# Patient Record
Sex: FEMALE | Race: WHITE | NOT HISPANIC OR LATINO | Employment: OTHER | URBAN - METROPOLITAN AREA
[De-identification: names, ages, dates, MRNs, and addresses within clinical notes are randomized per-mention and may not be internally consistent; named-entity substitution may affect disease eponyms.]

---

## 2017-01-09 ENCOUNTER — LAB REQUISITION (OUTPATIENT)
Dept: LAB | Facility: HOSPITAL | Age: 72
End: 2017-01-09

## 2017-01-09 DIAGNOSIS — N81.6 RECTOCELE: ICD-10-CM

## 2017-01-09 PROCEDURE — 88304 TISSUE EXAM BY PATHOLOGIST: CPT | Performed by: COLON & RECTAL SURGERY

## 2017-01-10 LAB
CYTO UR: NORMAL
LAB AP CASE REPORT: NORMAL
LAB AP CLINICAL INFORMATION: NORMAL
Lab: NORMAL
PATH REPORT.FINAL DX SPEC: NORMAL
PATH REPORT.GROSS SPEC: NORMAL

## 2022-10-21 ENCOUNTER — TELEPHONE (OUTPATIENT)
Dept: GASTROENTEROLOGY | Facility: CLINIC | Age: 77
End: 2022-10-21

## 2023-01-20 ENCOUNTER — OFFICE VISIT (OUTPATIENT)
Dept: GASTROENTEROLOGY | Facility: CLINIC | Age: 78
End: 2023-01-20
Payer: MEDICARE

## 2023-01-20 VITALS
SYSTOLIC BLOOD PRESSURE: 134 MMHG | WEIGHT: 190.8 LBS | HEART RATE: 73 BPM | TEMPERATURE: 98 F | DIASTOLIC BLOOD PRESSURE: 73 MMHG | HEIGHT: 63 IN | BODY MASS INDEX: 33.81 KG/M2

## 2023-01-20 DIAGNOSIS — K21.9 GASTROESOPHAGEAL REFLUX DISEASE, UNSPECIFIED WHETHER ESOPHAGITIS PRESENT: Primary | ICD-10-CM

## 2023-01-20 DIAGNOSIS — R10.13 EPIGASTRIC PAIN: ICD-10-CM

## 2023-01-20 PROCEDURE — 99204 OFFICE O/P NEW MOD 45 MIN: CPT | Performed by: NURSE PRACTITIONER

## 2023-01-20 RX ORDER — PANTOPRAZOLE SODIUM 40 MG/1
40 TABLET, DELAYED RELEASE ORAL DAILY
Qty: 90 TABLET | Refills: 3 | Status: SHIPPED | OUTPATIENT
Start: 2023-01-20 | End: 2023-03-10

## 2023-01-20 RX ORDER — LISINOPRIL 10 MG/1
10 TABLET ORAL DAILY
COMMUNITY
Start: 2022-12-21

## 2023-01-20 RX ORDER — TRAZODONE HYDROCHLORIDE 50 MG/1
50 TABLET ORAL NIGHTLY
COMMUNITY
Start: 2022-10-31

## 2023-01-20 RX ORDER — TIMOLOL MALEATE 5 MG/ML
SOLUTION/ DROPS OPHTHALMIC
COMMUNITY

## 2023-01-20 RX ORDER — LATANOPROST 50 UG/ML
SOLUTION/ DROPS OPHTHALMIC
COMMUNITY

## 2023-01-20 RX ORDER — OMEPRAZOLE 40 MG/1
40 CAPSULE, DELAYED RELEASE ORAL DAILY
COMMUNITY
Start: 2022-12-21 | End: 2023-01-20

## 2023-01-20 RX ORDER — CITALOPRAM 20 MG/1
20 TABLET ORAL DAILY
COMMUNITY
Start: 2023-01-06

## 2023-01-20 RX ORDER — HYDROCHLOROTHIAZIDE 12.5 MG/1
CAPSULE, GELATIN COATED ORAL
COMMUNITY
Start: 2023-01-16

## 2023-01-20 NOTE — PROGRESS NOTES
GASTROENTEROLOGY OFFICE NOTE  Kathy Whittington  [id number]  1945    CARE TEAM  Patient Care Team:  Malcolm Salinas MD as PCP - General (Family Medicine)    Referring Provider: Malcolm Salinas MD    Chief Complaint   Patient presents with   • Abdominal Pain   • Heartburn   • Nausea        HISTORY OF PRESENT ILLNESS:  Ms. Whittington is a 77-year-old female with complaints of chronic heartburn with associated nausea.  She has been on omeprazole 40 mg daily for several years.  She notes she has taken Nexium in the past as well.  She has been having some abdominal burning in her mid abdomen lately and sometimes feels burning into her back as well.  She has further complaints of early satiety.  She denies dysphagia, odynophagia, or vomiting.    PAST MEDICAL HISTORY  Past Medical History:   Diagnosis Date   • GERD (gastroesophageal reflux disease)    • Hypertension         PAST SURGICAL HISTORY  Past Surgical History:   Procedure Laterality Date   • APPENDECTOMY     • CHOLECYSTECTOMY     • COLONOSCOPY     • HEMORRHOIDECTOMY     • HERNIA REPAIR     • UPPER GASTROINTESTINAL ENDOSCOPY          MEDICATIONS:    Current Outpatient Medications:   •  citalopram (CeleXA) 20 MG tablet, Take 20 mg by mouth Daily., Disp: , Rfl:   •  latanoprost (XALATAN) 0.005 % ophthalmic solution, , Disp: , Rfl:   •  lisinopril (PRINIVIL,ZESTRIL) 10 MG tablet, Take 10 mg by mouth Daily., Disp: , Rfl:   •  timolol (TIMOPTIC) 0.5 % ophthalmic solution, , Disp: , Rfl:   •  traZODone (DESYREL) 50 MG tablet, Take 50 mg by mouth Every Night., Disp: , Rfl:   •  hydroCHLOROthiazide (MICROZIDE) 12.5 MG capsule, , Disp: , Rfl:   •  pantoprazole (PROTONIX) 40 MG EC tablet, Take 1 tablet by mouth Daily., Disp: 90 tablet, Rfl: 3    ALLERGIES  No Known Allergies    FAMILY HISTORY:  Family History   Problem Relation Age of Onset   • Colon cancer Neg Hx        SOCIAL HISTORY  Social History     Socioeconomic History   • Marital status:    Tobacco  "Use   • Smoking status: Never   • Smokeless tobacco: Never   Vaping Use   • Vaping Use: Never used   Substance and Sexual Activity   • Alcohol use: Never   • Drug use: Never   • Sexual activity: Defer         PHYSICAL EXAM   /73 (BP Location: Left arm, Patient Position: Sitting, Cuff Size: Adult)   Pulse 73   Temp 98 °F (36.7 °C) (Temporal)   Ht 158.8 cm (62.5\")   Wt 86.5 kg (190 lb 12.8 oz)   BMI 34.34 kg/m²   Physical Exam  Constitutional:       Appearance: Normal appearance.   HENT:      Head: Normocephalic and atraumatic.   Pulmonary:      Effort: Pulmonary effort is normal.   Abdominal:      General: There is no distension.      Palpations: Abdomen is soft.      Tenderness: There is abdominal tenderness in the epigastric area.   Neurological:      Mental Status: She is alert and oriented to person, place, and time.   Psychiatric:         Mood and Affect: Mood normal.         Thought Content: Thought content normal.           ASSESSMENT / PLAN  Diagnoses and all orders for this visit:    1. Gastroesophageal reflux disease, unspecified whether esophagitis present (Primary)  -     pantoprazole (PROTONIX) 40 MG EC tablet; Take 1 tablet by mouth Daily.  Dispense: 90 tablet; Refill: 3    2. Epigastric pain  -     Ambulatory referral for Screening EGD           Return for Follow up after procedures.    I discussed the patients findings and my recommendations with patient    BRETT Lee                    "

## 2023-03-10 ENCOUNTER — OUTSIDE FACILITY SERVICE (OUTPATIENT)
Dept: GASTROENTEROLOGY | Facility: CLINIC | Age: 78
End: 2023-03-10
Payer: MEDICARE

## 2023-03-10 PROCEDURE — 43239 EGD BIOPSY SINGLE/MULTIPLE: CPT | Performed by: INTERNAL MEDICINE

## 2023-03-10 RX ORDER — OMEPRAZOLE 40 MG/1
40 CAPSULE, DELAYED RELEASE ORAL
Qty: 60 CAPSULE | Refills: 11 | Status: SHIPPED | OUTPATIENT
Start: 2023-03-10 | End: 2023-03-20 | Stop reason: SDUPTHER

## 2023-03-17 ENCOUNTER — OFFICE VISIT (OUTPATIENT)
Dept: GASTROENTEROLOGY | Facility: CLINIC | Age: 78
End: 2023-03-17
Payer: MEDICARE

## 2023-03-17 VITALS
TEMPERATURE: 98 F | HEART RATE: 73 BPM | WEIGHT: 193.4 LBS | BODY MASS INDEX: 34.27 KG/M2 | SYSTOLIC BLOOD PRESSURE: 134 MMHG | DIASTOLIC BLOOD PRESSURE: 68 MMHG | HEIGHT: 63 IN

## 2023-03-17 DIAGNOSIS — R10.13 EPIGASTRIC PAIN: Primary | ICD-10-CM

## 2023-03-17 DIAGNOSIS — K21.9 GASTROESOPHAGEAL REFLUX DISEASE, UNSPECIFIED WHETHER ESOPHAGITIS PRESENT: ICD-10-CM

## 2023-03-17 PROCEDURE — 99214 OFFICE O/P EST MOD 30 MIN: CPT | Performed by: NURSE PRACTITIONER

## 2023-03-17 PROCEDURE — 1160F RVW MEDS BY RX/DR IN RCRD: CPT | Performed by: NURSE PRACTITIONER

## 2023-03-17 PROCEDURE — 1159F MED LIST DOCD IN RCRD: CPT | Performed by: NURSE PRACTITIONER

## 2023-03-17 NOTE — PROGRESS NOTES
GASTROENTEROLOGY OFFICE NOTE  Kathy Whittington  [id number]  1945    CARE TEAM  Patient Care Team:  Malcolm Salinas MD as PCP - General (Family Medicine)    Referring Provider: Malcolm Salinas MD    Chief Complaint   Patient presents with   • Heartburn        HISTORY OF PRESENT ILLNESS:  Patient presents in follow-up status post EGD for complaints of epigastric pain that radiates into her back.  She is described it as a burning type sensation.  She has been on omeprazole 40 mg daily for several years.    EGD showed a benign-appearing esophageal stenosis which was biopsied showed minimal chronic gastritis, a small hiatal hernia, mild post ulcer deformity in the duodenal bulb and multiple gastric polyps.  Omeprazole was increased to 40 mg twice daily at the time of her EGD.  Given additional complaints of bloating and frequent nausea gastroparesis was diagnosed clinically.    Today, she returns stating that she is not having the burning that she had before however, she continues with epigastric pain that is a sharp pain radiating into her back and across her upper abdomen.    PAST MEDICAL HISTORY  Past Medical History:   Diagnosis Date   • GERD (gastroesophageal reflux disease)    • Hypertension         PAST SURGICAL HISTORY  Past Surgical History:   Procedure Laterality Date   • APPENDECTOMY     • CHOLECYSTECTOMY     • COLONOSCOPY     • HEMORRHOIDECTOMY     • HERNIA REPAIR     • UPPER GASTROINTESTINAL ENDOSCOPY          MEDICATIONS:    Current Outpatient Medications:   •  citalopram (CeleXA) 20 MG tablet, Take 1 tablet by mouth Daily., Disp: , Rfl:   •  hydroCHLOROthiazide (MICROZIDE) 12.5 MG capsule, , Disp: , Rfl:   •  latanoprost (XALATAN) 0.005 % ophthalmic solution, , Disp: , Rfl:   •  lisinopril (PRINIVIL,ZESTRIL) 10 MG tablet, Take 1 tablet by mouth Daily., Disp: , Rfl:   •  omeprazole (priLOSEC) 40 MG capsule, Take 1 capsule by mouth 2 (Two) Times a Day Before Meals. Take a half hour before  "breakfast, Disp: 60 capsule, Rfl: 11  •  timolol (TIMOPTIC) 0.5 % ophthalmic solution, , Disp: , Rfl:   •  traZODone (DESYREL) 50 MG tablet, Take 1 tablet by mouth Every Night., Disp: , Rfl:     ALLERGIES  No Known Allergies    FAMILY HISTORY:  Family History   Problem Relation Age of Onset   • Colon cancer Neg Hx        SOCIAL HISTORY  Social History     Socioeconomic History   • Marital status:    Tobacco Use   • Smoking status: Never   • Smokeless tobacco: Never   Vaping Use   • Vaping Use: Never used   Substance and Sexual Activity   • Alcohol use: Never   • Drug use: Never   • Sexual activity: Defer         PHYSICAL EXAM   /68 (BP Location: Right arm, Patient Position: Sitting, Cuff Size: Adult)   Pulse 73   Temp 98 °F (36.7 °C) (Temporal)   Ht 158.8 cm (62.5\")   Wt 87.7 kg (193 lb 6.4 oz)   BMI 34.81 kg/m²   Physical Exam  Constitutional:       Appearance: Normal appearance.   HENT:      Head: Normocephalic and atraumatic.   Pulmonary:      Effort: Pulmonary effort is normal.   Abdominal:      General: There is no distension.      Palpations: Abdomen is soft.   Neurological:      Mental Status: She is alert and oriented to person, place, and time.   Psychiatric:         Mood and Affect: Mood normal.         Thought Content: Thought content normal.                [facility]   [address] [place] [address]   Phone: [phone] or [phone]   Fax: [phone]   Justen Clark M.D., Medical Director     PATIENT NAME                           LABORATORY NO.   LAKIA CALI                     [id number]   [id number]                         AGE              SEX  SSN           CLIENT REF #   [facility]           77      1945  F    [id number]   [id number]     [address] [facility]              REQUESTING M.D.     ATTENDING M.D.     [place] [address]                NAVIN ZARAGOZA ADALBERTO     "       TALIA   DATE COLLECTED      DATE RECEIVED      DATE REPORTED   03/10/2023          03/10/2023         03/13/2023     CORRECTION PRESENT   CORRECTED:   Corrected patient name - kia 3/15/23     DIAGNOSIS:   A.   GASTRIC ANTRUM, BIOPSY: Mild reactive gastropathic changes with   background of minimal chronic gastritis without activity.   Negative for intestinal metaplasia and dysplasia.   No Helicobacter pylori-like organisms identified on routine stains.   B.   GE JUNCTION: Squamous mucosa with mild basal layer hyperplasia   and slight increase in intraepithelial leukocytes without eosinophils.   Gastric cardia type mucosa showing minimal chronic gastritis without activity.   Negative for intestinal metaplasia and dysplasia.   Histologic changes present are mild and felt to be nonspecific.     CLINICAL HISTORY:   Epigastric pain, nausea, heartburn, esophageal obstruction, diaphragmatic hernia   without obstruction or gangrene, other diseases of stomach and duodenum, polyp   of stomach and duodenum     SPECIMENS RECEIVED:   A.  GASTRIC ANTRUM, BIOPSY   B.  GE JUNCTION     MICROSCOPIC DESCRIPTION:   A.  Tissue blocks are prepared and slides are examined microscopically on all   specimens. See diagnosis for details.   B.  Tissue blocks are prepared and slides are examined microscopically on all   specimens. See diagnosis for details.     Professional interpretation rendered by Oleg Sneed M.D., F.C.A.P. at   [facility], [facility], [address] [place] [id number]         Results Review:      ASSESSMENT / PLAN  Diagnoses and all orders for this visit:    1. Epigastric pain (Primary)  -     CT Abdomen Without Contrast; Future    2. Gastroesophageal reflux disease, unspecified whether esophagitis present  -     omeprazole (priLOSEC) 40 MG capsule; Take 1 capsule by mouth 2 (Two) Times a Day Before Meals. Take a half hour before breakfast  Dispense: 60 capsule; Refill: 11           Return in about 2 months  (around 5/17/2023).    I discussed the patients findings and my recommendations with patient    BRETT Lee

## 2023-03-20 RX ORDER — OMEPRAZOLE 40 MG/1
40 CAPSULE, DELAYED RELEASE ORAL
Qty: 60 CAPSULE | Refills: 11
Start: 2023-03-20

## 2023-04-24 ENCOUNTER — HOSPITAL ENCOUNTER (OUTPATIENT)
Dept: CT IMAGING | Facility: HOSPITAL | Age: 78
Discharge: HOME | End: 2023-04-24
Admitting: NURSE PRACTITIONER
Payer: MEDICARE

## 2023-04-24 DIAGNOSIS — R10.13 EPIGASTRIC PAIN: ICD-10-CM

## 2023-04-24 PROCEDURE — 74150 CT ABDOMEN W/O CONTRAST: CPT

## 2023-08-03 RX ORDER — SODIUM, POTASSIUM,MAG SULFATES 17.5-3.13G
2 SOLUTION, RECONSTITUTED, ORAL ORAL TAKE AS DIRECTED
Qty: 354 ML | Refills: 0 | Status: SHIPPED | OUTPATIENT
Start: 2023-08-03

## 2023-08-11 ENCOUNTER — OUTSIDE FACILITY SERVICE (OUTPATIENT)
Dept: GASTROENTEROLOGY | Facility: CLINIC | Age: 78
End: 2023-08-11
Payer: MEDICARE

## 2023-08-11 PROCEDURE — 88305 TISSUE EXAM BY PATHOLOGIST: CPT

## 2023-08-11 PROCEDURE — 45380 COLONOSCOPY AND BIOPSY: CPT | Performed by: INTERNAL MEDICINE

## 2023-08-14 ENCOUNTER — LAB REQUISITION (OUTPATIENT)
Dept: LAB | Facility: HOSPITAL | Age: 78
End: 2023-08-14
Payer: MEDICARE

## 2023-08-14 DIAGNOSIS — R10.9 UNSPECIFIED ABDOMINAL PAIN: ICD-10-CM

## 2023-08-14 DIAGNOSIS — K57.30 DIVERTICULOSIS OF LARGE INTESTINE WITHOUT PERFORATION OR ABSCESS WITHOUT BLEEDING: ICD-10-CM

## 2023-08-14 DIAGNOSIS — Z86.010 PERSONAL HISTORY OF COLONIC POLYPS: ICD-10-CM

## 2023-08-14 DIAGNOSIS — R19.7 DIARRHEA, UNSPECIFIED: ICD-10-CM

## 2023-08-15 LAB — REF LAB TEST METHOD: NORMAL

## 2023-09-13 ENCOUNTER — OFFICE VISIT (OUTPATIENT)
Dept: GASTROENTEROLOGY | Facility: CLINIC | Age: 78
End: 2023-09-13
Payer: MEDICARE

## 2023-09-13 VITALS
WEIGHT: 186 LBS | HEIGHT: 63 IN | SYSTOLIC BLOOD PRESSURE: 122 MMHG | TEMPERATURE: 97.3 F | DIASTOLIC BLOOD PRESSURE: 48 MMHG | BODY MASS INDEX: 32.96 KG/M2 | HEART RATE: 79 BPM

## 2023-09-13 DIAGNOSIS — K21.9 GASTROESOPHAGEAL REFLUX DISEASE, UNSPECIFIED WHETHER ESOPHAGITIS PRESENT: Primary | ICD-10-CM

## 2023-09-13 DIAGNOSIS — R19.7 DIARRHEA, UNSPECIFIED TYPE: ICD-10-CM

## 2023-09-13 PROCEDURE — 1160F RVW MEDS BY RX/DR IN RCRD: CPT | Performed by: NURSE PRACTITIONER

## 2023-09-13 PROCEDURE — 1159F MED LIST DOCD IN RCRD: CPT | Performed by: NURSE PRACTITIONER

## 2023-09-13 PROCEDURE — 99213 OFFICE O/P EST LOW 20 MIN: CPT | Performed by: NURSE PRACTITIONER

## 2023-09-13 RX ORDER — PRAMIPEXOLE DIHYDROCHLORIDE 0.25 MG/1
TABLET ORAL
COMMUNITY
Start: 2023-08-16

## 2023-09-13 NOTE — PROGRESS NOTES
GASTROENTEROLOGY OFFICE NOTE  Kathy Whittington  [id number]  1945    CARE TEAM  Patient Care Team:  Malcolm Salinas MD as PCP - General (Family Medicine)    Referring Provider: Malcolm Salinas MD    Chief Complaint   Patient presents with    Follow-up        HISTORY OF PRESENT ILLNESS:  Patient presents in follow-up with chronic GERD and recent complaints of diarrhea status post colonoscopy.    She continues on omeprazole 40 mg twice daily with good control of her acid reflux.    Colonoscopy was remarkable for diverticulosis.  Etiology of her complaint of diarrhea was not determined.  She reports back today that her diarrhea has since resolved.  She feels the sudden onset of diarrhea was related to her diet.  She had been eating multiple peaches daily and had increased consumption of lactulose prior to the onset of diarrhea.  She has lessened the lactulose that she is emptying and it is no longer fresh peach season so she is no longer eating peaches.  Her diarrhea has resolved entirely.    PAST MEDICAL HISTORY  Past Medical History:   Diagnosis Date    GERD (gastroesophageal reflux disease)     Hypertension         PAST SURGICAL HISTORY  Past Surgical History:   Procedure Laterality Date    APPENDECTOMY      CHOLECYSTECTOMY      COLONOSCOPY      HEMORRHOIDECTOMY      HERNIA REPAIR      UPPER GASTROINTESTINAL ENDOSCOPY          MEDICATIONS:    Current Outpatient Medications:     citalopram (CeleXA) 20 MG tablet, Take 1 tablet by mouth Daily., Disp: , Rfl:     hydroCHLOROthiazide (MICROZIDE) 12.5 MG capsule, , Disp: , Rfl:     lisinopril (PRINIVIL,ZESTRIL) 10 MG tablet, Take 1 tablet by mouth Daily., Disp: , Rfl:     omeprazole (priLOSEC) 40 MG capsule, Take 1 capsule by mouth 2 (Two) Times a Day Before Meals. Take a half hour before breakfast (Patient taking differently: Take 1 capsule by mouth Daily. Take a half hour before breakfast), Disp: 60 capsule, Rfl: 11    pramipexole (MIRAPEX) 0.25 MG tablet, ,  "Disp: , Rfl:     timolol (TIMOPTIC) 0.5 % ophthalmic solution, , Disp: , Rfl:     traZODone (DESYREL) 50 MG tablet, Take 1 tablet by mouth Every Night., Disp: , Rfl:     ALLERGIES  No Known Allergies    FAMILY HISTORY:  Family History   Problem Relation Age of Onset    Colon cancer Neg Hx        SOCIAL HISTORY  Social History     Socioeconomic History    Marital status:    Tobacco Use    Smoking status: Never    Smokeless tobacco: Never   Vaping Use    Vaping Use: Never used   Substance and Sexual Activity    Alcohol use: Never    Drug use: Never    Sexual activity: Defer         PHYSICAL EXAM   /48 (BP Location: Right arm, Patient Position: Sitting, Cuff Size: Adult)   Pulse 79   Temp 97.3 °F (36.3 °C) (Temporal)   Ht 158.8 cm (62.5\")   Wt 84.4 kg (186 lb)   BMI 33.48 kg/m²   Physical Exam  Constitutional:       Appearance: Normal appearance.   HENT:      Head: Normocephalic and atraumatic.   Pulmonary:      Effort: Pulmonary effort is normal.   Neurological:      Mental Status: She is alert and oriented to person, place, and time.   Psychiatric:         Mood and Affect: Mood normal.         Thought Content: Thought content normal.         Results Review:  [facility]  [address] [place] [address]  Phone: [phone] or [phone]  Fax: [phone]  Justen Clark M.D., Medical Director    PATIENT NAME                           LABORATORY NO.  153  LAKIA RODRÍGUEZ                     [id number]  [id number]                         AGE              SEX  SSN           CLIENT REF #  [facility]           77      1945  F    [id number]   [id number]    [address] [facility]              REQUESTING M.D.     ATTENDING [place] [address]                NAVIN ZARAGOZA ADALBERTO THOMAS  DATE COLLECTED      DATE RECEIVED      DATE REPORTED  2023         " 08/15/2023    DIAGNOSIS:  RANDOM COLON BIOPSIES:  Colonic mucosa with no significant pathologic change    [place]    CLINICAL HISTORY:  Diverticulosis of large intestine without perforation or abscess without bleeding,  Personal history of colonic polyps    SPECIMENS RECEIVED:  RANDOM COLON BIOPSIES    MICROSCOPIC DESCRIPTION:  Tissue blocks are prepared and slides are examined microscopically on all  specimens. See diagnosis for details.      ASSESSMENT / PLAN  Diagnoses and all orders for this visit:    1. Gastroesophageal reflux disease, unspecified whether esophagitis present (Primary)    2. Diarrhea, unspecified type       -Continue omeprazole 40 mg twice daily  - Diarrhea resolved etiology felt to be related to dietary indiscretions.    Return in about 1 year (around 9/13/2024).    I discussed the patients findings and my recommendations with patient    BRETT Lee

## 2024-05-25 DIAGNOSIS — K21.9 GASTROESOPHAGEAL REFLUX DISEASE, UNSPECIFIED WHETHER ESOPHAGITIS PRESENT: ICD-10-CM

## 2024-05-28 RX ORDER — OMEPRAZOLE 40 MG/1
CAPSULE, DELAYED RELEASE ORAL
Qty: 60 CAPSULE | Refills: 11 | Status: SHIPPED | OUTPATIENT
Start: 2024-05-28

## 2024-09-24 ENCOUNTER — OFFICE VISIT (OUTPATIENT)
Dept: GASTROENTEROLOGY | Facility: CLINIC | Age: 79
End: 2024-09-24
Payer: MEDICARE

## 2024-09-24 VITALS
HEIGHT: 63 IN | SYSTOLIC BLOOD PRESSURE: 152 MMHG | DIASTOLIC BLOOD PRESSURE: 78 MMHG | TEMPERATURE: 97.5 F | BODY MASS INDEX: 33.49 KG/M2 | HEART RATE: 65 BPM | WEIGHT: 189 LBS

## 2024-09-24 DIAGNOSIS — K21.9 GASTROESOPHAGEAL REFLUX DISEASE, UNSPECIFIED WHETHER ESOPHAGITIS PRESENT: Primary | ICD-10-CM

## 2024-09-24 PROCEDURE — 1159F MED LIST DOCD IN RCRD: CPT

## 2024-09-24 PROCEDURE — 99213 OFFICE O/P EST LOW 20 MIN: CPT

## 2024-09-24 PROCEDURE — 1160F RVW MEDS BY RX/DR IN RCRD: CPT

## 2024-09-24 RX ORDER — OMEPRAZOLE 40 MG/1
40 CAPSULE, DELAYED RELEASE ORAL DAILY
Qty: 90 CAPSULE | Refills: 3 | Status: SHIPPED | OUTPATIENT
Start: 2024-09-24

## 2024-09-24 RX ORDER — LATANOPROST 50 UG/ML
SOLUTION/ DROPS OPHTHALMIC
COMMUNITY
Start: 2024-08-31

## 2024-09-24 RX ORDER — DORZOLAMIDE HYDROCHLORIDE AND TIMOLOL MALEATE 20; 5 MG/ML; MG/ML
SOLUTION/ DROPS OPHTHALMIC
COMMUNITY
Start: 2024-08-31

## 2025-04-14 ENCOUNTER — OFFICE VISIT (OUTPATIENT)
Dept: GASTROENTEROLOGY | Facility: CLINIC | Age: 80
End: 2025-04-14
Payer: MEDICARE

## 2025-04-14 VITALS
SYSTOLIC BLOOD PRESSURE: 157 MMHG | BODY MASS INDEX: 35.28 KG/M2 | WEIGHT: 196 LBS | DIASTOLIC BLOOD PRESSURE: 80 MMHG | HEART RATE: 79 BPM

## 2025-04-14 DIAGNOSIS — K21.9 GASTROESOPHAGEAL REFLUX DISEASE, UNSPECIFIED WHETHER ESOPHAGITIS PRESENT: ICD-10-CM

## 2025-04-14 DIAGNOSIS — K59.04 CHRONIC IDIOPATHIC CONSTIPATION: Primary | ICD-10-CM

## 2025-04-14 RX ORDER — MONTELUKAST SODIUM 10 MG/1
10 TABLET ORAL NIGHTLY
COMMUNITY
Start: 2025-04-03

## 2025-04-14 RX ORDER — DORZOLAMIDE HCL 20 MG/ML
SOLUTION/ DROPS OPHTHALMIC NIGHTLY
COMMUNITY
Start: 2025-01-24

## 2025-04-14 RX ORDER — LOSARTAN POTASSIUM 50 MG/1
50 TABLET ORAL DAILY
COMMUNITY
Start: 2025-03-31 | End: 2026-03-31

## 2025-04-14 RX ORDER — ALBUTEROL SULFATE 90 UG/1
INHALANT RESPIRATORY (INHALATION)
COMMUNITY
Start: 2024-10-31

## 2025-04-14 RX ORDER — OMEPRAZOLE 40 MG/1
40 CAPSULE, DELAYED RELEASE ORAL 2 TIMES DAILY
Qty: 180 CAPSULE | Refills: 3 | Status: SHIPPED | OUTPATIENT
Start: 2025-04-14

## 2025-04-14 RX ORDER — BUDESONIDE AND FORMOTEROL FUMARATE 160; 4.5 UG/1; UG/1
AEROSOL, METERED RESPIRATORY (INHALATION)
COMMUNITY
Start: 2025-01-23

## 2025-04-14 NOTE — PROGRESS NOTES
Office Note     Name: Kathy Whittington    : 1945     MRN: [id number]     Chief Complaint  Gastric Pain    Subjective     History of Present Illness:  History of Present Illness  Patient is a 79 year old female who presents for follow-up of bilateral lower abdominal pain, which is often worse after meals, with significant bloating.  Her  passed away in April of last year and she reports that her eating habits have suffered as a result, often eating convenience foods and not a lot of of fiber or whole foods.  She has recent history of diarrhea which resolved spontaneously, but stools have now been on the firmer side with periods of constipation.  She remains on omeprazole 40 mg twice daily with good control of acid reflux.    Past Medical History:   Past Medical History:   Diagnosis Date    GERD (gastroesophageal reflux disease)     Hypertension        Past Surgical History:   Past Surgical History:   Procedure Laterality Date    APPENDECTOMY      CHOLECYSTECTOMY      COLONOSCOPY      HEMORRHOIDECTOMY      HERNIA REPAIR      UPPER GASTROINTESTINAL ENDOSCOPY         Immunizations:   Immunization History   Administered Date(s) Administered    COVID-19 (PFIZER) 12YRS+ (COMIRNATY) 2023    COVID-19 (PFIZER) BIVALENT 12+YRS 2022    COVID-19 (PFIZER) Purple Cap Monovalent 2021, 2021, 10/02/2021    Covid-19 (Pfizer) Gray Cap Monovalent 2022        Medications:     Current Outpatient Medications:     albuterol sulfate  (90 Base) MCG/ACT inhaler, , Disp: , Rfl:     Breyna 160-4.5 MCG/ACT inhaler, , Disp: , Rfl:     citalopram (CeleXA) 20 MG tablet, Take 1 tablet by mouth Daily., Disp: , Rfl:     dorzolamide (TRUSOPT) 2 % ophthalmic solution, Administer  to both eyes Every Night., Disp: , Rfl:     dorzolamide-timolol (COSOPT) 2-0.5 % ophthalmic solution, , Disp: , Rfl:     hydroCHLOROthiazide (MICROZIDE) 12.5 MG capsule, , Disp: , Rfl:     latanoprost (XALATAN) 0.005 %  "ophthalmic solution, , Disp: , Rfl:     losartan (COZAAR) 50 MG tablet, Take 1 tablet by mouth Daily., Disp: , Rfl:     montelukast (SINGULAIR) 10 MG tablet, Take 1 tablet by mouth Every Night., Disp: , Rfl:     multivitamin with minerals (MULTIVITAMIN ADULT PO), Take 1 tablet by mouth Daily., Disp: , Rfl:     omeprazole (priLOSEC) 40 MG capsule, Take 1 capsule by mouth Daily., Disp: 90 capsule, Rfl: 3    pramipexole (MIRAPEX) 0.25 MG tablet, , Disp: , Rfl:     Allergies:   No Known Allergies    Family History:   Family History   Problem Relation Age of Onset    Colon cancer Neg Hx        Social History:   Social History     Socioeconomic History    Marital status:    Tobacco Use    Smoking status: Never    Smokeless tobacco: Never   Vaping Use    Vaping status: Never Used   Substance and Sexual Activity    Alcohol use: Never    Drug use: Never    Sexual activity: Defer         Objective     Vital Signs  /80 (BP Location: Left arm, Patient Position: Sitting, Cuff Size: Adult)   Pulse 79   Wt 88.9 kg (196 lb)   BMI 35.28 kg/m²   Estimated body mass index is 35.28 kg/m² as calculated from the following:    Height as of 9/24/24: 158.8 cm (62.5\").    Weight as of this encounter: 88.9 kg (196 lb).          Physical Exam  Vitals reviewed.   Constitutional:       General: She is awake.   Cardiovascular:      Rate and Rhythm: Normal rate.   Pulmonary:      Effort: Pulmonary effort is normal.   Abdominal:      Palpations: Abdomen is soft.      Tenderness: There is no abdominal tenderness.   Skin:     General: Skin is warm and dry.   Neurological:      Mental Status: She is alert.        Physical Exam      Results        Assessment and Plan     Assessment & Plan  Gastroesophageal reflux disease, unspecified whether esophagitis present    Orders:    omeprazole (priLOSEC) 40 MG capsule; Take 1 capsule by mouth 2 (Two) Times a Day.    Chronic idiopathic constipation  Increase dietary fiber to ideally 25-30 g a " day, using fiber supplement as needed.  Recommend FiberCon tablets as they do not cause bloating.  Recommended bowels cleanse with MiraLAX which was discussed in office and provided in writing.  If no improvement in abdominal pain with treatment of constipation would recommend imaging to rule out diverticulitis, given known history of diverticulosis seen on recent colonoscopy in 2023.  Given increase in life stressors over the past year with the passing of her , she may also be experiencing some functional symptoms related to IBS, which may be better managed with medication like Elavil.  Will consider this if the remainder of workup is unremarkable.          Assessment & Plan        Follow Up  6 months    Patient or patient representative verbalized consent for the use of Ambient Listening during the visit with  BRETT Pappas for chart documentation. 4/14/2025  15:51 EDT    BRETT Pappas  [facility] [address]  [facility]  [address] [place] [id number]

## 2025-05-22 ENCOUNTER — TELEPHONE (OUTPATIENT)
Dept: GASTROENTEROLOGY | Facility: CLINIC | Age: 80
End: 2025-05-22
Payer: MEDICARE

## 2025-05-22 NOTE — TELEPHONE ENCOUNTER
PATIENT REQUESTED SOONER APPOINTMENT. OFFERED PATIENT APPOINTMENT ON 05.23.2025 AT 12PM.  PATIENT DECLINED.

## 2025-05-27 ENCOUNTER — LAB (OUTPATIENT)
Dept: LAB | Facility: HOSPITAL | Age: 80
End: 2025-05-27
Payer: MEDICARE

## 2025-05-27 ENCOUNTER — OFFICE VISIT (OUTPATIENT)
Dept: GASTROENTEROLOGY | Facility: CLINIC | Age: 80
End: 2025-05-27
Payer: MEDICARE

## 2025-05-27 VITALS — DIASTOLIC BLOOD PRESSURE: 67 MMHG | SYSTOLIC BLOOD PRESSURE: 146 MMHG | WEIGHT: 194 LBS | BODY MASS INDEX: 34.92 KG/M2

## 2025-05-27 DIAGNOSIS — R74.8 ELEVATED LIVER ENZYMES: ICD-10-CM

## 2025-05-27 DIAGNOSIS — R74.8 ELEVATED LIVER ENZYMES: Primary | ICD-10-CM

## 2025-05-27 DIAGNOSIS — L29.9 PRURITUS: ICD-10-CM

## 2025-05-27 LAB
GGT SERPL-CCNC: 25 U/L (ref 5–36)
IGG1 SER-MCNC: 886 MG/DL (ref 700–1600)

## 2025-05-27 PROCEDURE — 82784 ASSAY IGA/IGD/IGG/IGM EACH: CPT

## 2025-05-27 PROCEDURE — 86381 MITOCHONDRIAL ANTIBODY EACH: CPT

## 2025-05-27 PROCEDURE — 1160F RVW MEDS BY RX/DR IN RCRD: CPT

## 2025-05-27 PROCEDURE — 36415 COLL VENOUS BLD VENIPUNCTURE: CPT

## 2025-05-27 PROCEDURE — 99214 OFFICE O/P EST MOD 30 MIN: CPT

## 2025-05-27 PROCEDURE — 82977 ASSAY OF GGT: CPT

## 2025-05-27 PROCEDURE — 86015 ACTIN ANTIBODY EACH: CPT

## 2025-05-27 PROCEDURE — 1159F MED LIST DOCD IN RCRD: CPT

## 2025-05-27 PROCEDURE — G2211 COMPLEX E/M VISIT ADD ON: HCPCS

## 2025-05-27 RX ORDER — METFORMIN HYDROCHLORIDE 500 MG/1
TABLET, EXTENDED RELEASE ORAL
COMMUNITY
Start: 2025-05-23

## 2025-05-27 RX ORDER — TIMOLOL MALEATE 10 MG/1
TABLET ORAL DAILY
COMMUNITY

## 2025-05-27 RX ORDER — CHOLESTYRAMINE 4 G/9G
4 POWDER, FOR SUSPENSION ORAL 2 TIMES DAILY
Qty: 378 G | Refills: 3 | Status: SHIPPED | OUTPATIENT
Start: 2025-05-27

## 2025-05-27 RX ORDER — FERROUS SULFATE 325(65) MG
TABLET ORAL
COMMUNITY
Start: 2025-04-18

## 2025-05-27 NOTE — PATIENT INSTRUCTIONS
Cholestyramine for itching: don't take other medications 1 hour before or 4 hours after. This can be taken any time of day that works with your other medication dosing.

## 2025-05-27 NOTE — PROGRESS NOTES
Office Note     Name: Kathy Whittington    : 1945     MRN: [id number]     Chief Complaint  Anemia    Subjective     History of Present Illness:  History of Present Illness  The patient is a 79-year-old female who presents today for follow-up of abdominal pain and anemia.    She reports intermittent abdominal pain, which she manages with omeprazole twice daily. She had significant improvement in abdominal pain following bowel cleanse and maintains with intermittent use of miralax. The discomfort is primarily in the LLQ of her abdomen, described as burning. She is uncertain if the pain is influenced by bowel movements. Adhering to a diet that excludes fried foods has helped manage her symptoms. She reports no postprandial discomfort, nausea, or abdominal pain. Occasionally, she experiences difficulty breathing, which she thinks may be related to her hiatal hernia.     Her primary care physician, Dr. Salinas, expressed concern about her worsening anemia and potential bleeding, given history of gastrointestinal bleeding and ulcers, with her last EGD and colonoscopy performed in . She has observed dark green stools, which have lightened in color over the past week. She denies any obvious bleeding or black stools. She has been supplementing with iron and has noticed some stool color change with this.    She has been experiencing significant itching on her arms, legs, and hands, which is not constant but occurs frequently. Her ALP was very mildly elevated on most recent labs. She is s/p CCY.     She has recently been diagnosed with diabetes.    SOCIAL HISTORY  Diet: Watches fried foods to avoid heartburn and reflux.    Past Medical History:   Past Medical History:   Diagnosis Date    Diabetes mellitus     GERD (gastroesophageal reflux disease)     Hypertension        Past Surgical History:   Past Surgical History:   Procedure Laterality Date    APPENDECTOMY      CHOLECYSTECTOMY      COLONOSCOPY       HEMORRHOIDECTOMY      HERNIA REPAIR      UPPER GASTROINTESTINAL ENDOSCOPY         Immunizations:   Immunization History   Administered Date(s) Administered    COVID-19 (PFIZER) 12YRS+ (COMIRNATY) 09/29/2023    COVID-19 (PFIZER) BIVALENT 12+YRS 09/28/2022    COVID-19 (PFIZER) Purple Cap Monovalent 01/22/2021, 02/17/2021, 10/02/2021    Covid-19 (Pfizer) Gray Cap Monovalent 04/09/2022        Medications:     Current Outpatient Medications:     albuterol sulfate  (90 Base) MCG/ACT inhaler, , Disp: , Rfl:     Breyna 160-4.5 MCG/ACT inhaler, , Disp: , Rfl:     citalopram (CeleXA) 20 MG tablet, Take 1 tablet by mouth Daily., Disp: , Rfl:     dorzolamide (TRUSOPT) 2 % ophthalmic solution, Administer  to both eyes Every Night., Disp: , Rfl:     dorzolamide-timolol (COSOPT) 2-0.5 % ophthalmic solution, , Disp: , Rfl:     ferrous sulfate 325 (65 FE) MG tablet, Take 1 tablet every other day with a vitamin C tablet., Disp: , Rfl:     hydroCHLOROthiazide (MICROZIDE) 12.5 MG capsule, , Disp: , Rfl:     latanoprost (XALATAN) 0.005 % ophthalmic solution, , Disp: , Rfl:     losartan (COZAAR) 50 MG tablet, Take 1 tablet by mouth Daily., Disp: , Rfl:     metFORMIN ER (GLUCOPHAGE-XR) 500 MG 24 hr tablet, One po bid with food., Disp: , Rfl:     multivitamin with minerals (MULTIVITAMIN ADULT PO), Take 1 tablet by mouth Daily., Disp: , Rfl:     omeprazole (priLOSEC) 40 MG capsule, Take 1 capsule by mouth 2 (Two) Times a Day., Disp: 180 capsule, Rfl: 3    pramipexole (MIRAPEX) 0.25 MG tablet, , Disp: , Rfl:     timolol (BLOCADREN) 10 MG tablet, Daily., Disp: , Rfl:     Allergies:   No Known Allergies    Family History:   Family History   Problem Relation Age of Onset    Colon cancer Neg Hx        Social History:   Social History     Socioeconomic History    Marital status:    Tobacco Use    Smoking status: Never    Smokeless tobacco: Never   Vaping Use    Vaping status: Never Used   Substance and Sexual Activity    Alcohol  "use: Never    Drug use: Never    Sexual activity: Defer         Objective     Vital Signs  /67 (BP Location: Right arm, Patient Position: Sitting, Cuff Size: Large Adult)   Wt 88 kg (194 lb)   BMI 34.92 kg/m²   Estimated body mass index is 34.92 kg/m² as calculated from the following:    Height as of 9/24/24: 158.8 cm (62.5\").    Weight as of this encounter: 88 kg (194 lb).          Physical Exam  Vitals reviewed.   Constitutional:       General: She is awake.   Cardiovascular:      Rate and Rhythm: Normal rate.   Pulmonary:      Effort: Pulmonary effort is normal.   Abdominal:      Palpations: Abdomen is soft.      Tenderness: There is no abdominal tenderness.   Skin:     General: Skin is warm and dry.   Neurological:      Mental Status: She is alert.        Physical Exam  General: Patient appears fatigued.  Abdomen: Tenderness noted in the left lower quadrant, consistent with diverticulosis.  Skin: Patient reports intermittent itching on arms, legs, and hands.    Results  Labs   - Iron levels: 04/2025, Low   - Hematocrit: 04/2025, Dropped by five points from the previous year   - Platelets: 04/2025, Slightly lower than before   - Liver enzymes: 04/2025, Mildly elevated    Imaging   - EGD: 03/2023, Stenosis at the GE junction, small hiatal hernia, evidence of prior ulcers in the duodenal bulb, and fundic gland polyps in the stomach   - Colonoscopy: 2023, Diverticulosis in the descending and sigmoid colon   - Imaging of the abdomen and liver: 2023, Normal    Diagnostic Testing   - Esophagus biopsies: Changes consistent with reflux   - Stomach biopsies: Negative for bacteria or cancerous cells      Assessment and Plan     Assessment & Plan  Elevated liver enzymes    Orders:    Mitochondrial Antibodies, M2; Future    Gamma GT    IgG; Future    Anti-Smooth Muscle Antibody Titer; Future    Pruritus    Orders:    cholestyramine (QUESTRAN) 4 GM/DOSE powder; Take 1 packet by mouth 2 (Two) Times a Day. mixed with a " liquid       Assessment & Plan  1. Abdominal pain:  - Diverticulosis in the descending and sigmoid colon noted on last colonoscopy. This is primarily where she experiences abdominal pain. Recommend avoiding constipation, and ensuring dietary fiber intake of 25-30 grams daily.   - Continue omeprazole twice daily.  - Continue MiraLAX and add fiber to diet to bulk stools and prevent constipation.    2. Iron deficiency anemia:  - H/H have slowly declined, with 5 point drop in HCT since last year.   - EGD to evaluate for potential sources of bleeding.   - Continue iron supplementation.    3. Pruritus:  - Prescribe cholestyramine at one packet twice daily.  - Take cholestyramine one hour after, or 4 hours before other medications as it will interfere with absorption of other drugs.  - Conduct laboratory tests to assess for potential autoimmune conditions, although no indication to treat right now if found.     Follow-up: Schedule EGD with Dr. Salas.      Follow Up  After EGD if abnormal findings    Patient or patient representative verbalized consent for the use of Ambient Listening during the visit with  BRETT Pappas for chart documentation. 5/27/2025  12:26 EDT    BRETT Pappas  [facility]  [facility]  [address] [place] [id number]

## 2025-05-28 LAB
MITOCHONDRIA M2 IGG SER-ACNC: <20 UNITS (ref 0–20)
SMA IGG SER-ACNC: 3 UNITS (ref 0–19)

## 2025-07-25 ENCOUNTER — OUTSIDE FACILITY SERVICE (OUTPATIENT)
Dept: GASTROENTEROLOGY | Facility: CLINIC | Age: 80
End: 2025-07-25
Payer: MEDICARE

## 2025-07-25 ENCOUNTER — LAB REQUISITION (OUTPATIENT)
Dept: LAB | Facility: HOSPITAL | Age: 80
End: 2025-07-25
Payer: MEDICARE

## 2025-07-25 DIAGNOSIS — D50.9 IRON DEFICIENCY ANEMIA, UNSPECIFIED: ICD-10-CM

## 2025-07-25 DIAGNOSIS — R10.84 GENERALIZED ABDOMINAL PAIN: ICD-10-CM

## 2025-07-25 DIAGNOSIS — K31.7 POLYP OF STOMACH AND DUODENUM: ICD-10-CM

## 2025-07-25 DIAGNOSIS — K22.2 ESOPHAGEAL OBSTRUCTION: ICD-10-CM

## 2025-07-25 DIAGNOSIS — K44.9 DIAPHRAGMATIC HERNIA WITHOUT OBSTRUCTION OR GANGRENE: ICD-10-CM

## 2025-07-25 PROCEDURE — 43239 EGD BIOPSY SINGLE/MULTIPLE: CPT | Performed by: INTERNAL MEDICINE

## 2025-07-25 PROCEDURE — 88305 TISSUE EXAM BY PATHOLOGIST: CPT | Performed by: INTERNAL MEDICINE

## 2025-07-29 LAB
CYTO UR: NORMAL
LAB AP CASE REPORT: NORMAL
LAB AP CLINICAL INFORMATION: NORMAL
PATH REPORT.FINAL DX SPEC: NORMAL
PATH REPORT.GROSS SPEC: NORMAL

## 2025-08-01 ENCOUNTER — OFFICE VISIT (OUTPATIENT)
Dept: GASTROENTEROLOGY | Facility: CLINIC | Age: 80
End: 2025-08-01
Payer: MEDICARE

## 2025-08-01 ENCOUNTER — RESULTS FOLLOW-UP (OUTPATIENT)
Dept: LAB | Facility: HOSPITAL | Age: 80
End: 2025-08-01
Payer: MEDICARE

## 2025-08-01 VITALS
WEIGHT: 183.2 LBS | BODY MASS INDEX: 32.46 KG/M2 | HEART RATE: 72 BPM | DIASTOLIC BLOOD PRESSURE: 79 MMHG | HEIGHT: 63 IN | TEMPERATURE: 97.1 F | SYSTOLIC BLOOD PRESSURE: 128 MMHG

## 2025-08-01 DIAGNOSIS — K21.9 GASTROESOPHAGEAL REFLUX DISEASE, UNSPECIFIED WHETHER ESOPHAGITIS PRESENT: ICD-10-CM

## 2025-08-01 RX ORDER — OMEPRAZOLE 40 MG/1
40 CAPSULE, DELAYED RELEASE ORAL DAILY
Qty: 90 CAPSULE | Refills: 3 | Status: SHIPPED | OUTPATIENT
Start: 2025-08-01

## 2025-08-01 RX ORDER — DORZOLAMIDE HCL 20 MG/ML
SOLUTION/ DROPS OPHTHALMIC
COMMUNITY

## 2025-08-01 RX ORDER — TIRZEPATIDE 2.5 MG/.5ML
INJECTION, SOLUTION SUBCUTANEOUS
COMMUNITY
Start: 2025-07-25

## 2025-08-01 NOTE — TELEPHONE ENCOUNTER
August 1, 2025    Kathy Whittington  [address] [facility] [id number]      Dear Ms. Whittington:  This letter is to review the biopsy report from your July 25, 2025 upper endoscopy.    Biopsies from the duodenum returned negative for celiac disease.  Biopsies from esophagus were negative for intestinal metaplasia/Fletcher's esophagus or eosinophilic esophagitis.    A cause for your iron deficiency anemia remains unclear and consideration should be given to a video capsule endoscopy/PillCam to look at the small bowel particularly if you are having refractory/recurrent iron deficiency.    I see you have a follow-up appointment on August 1, 2025 with BRETT Pappas.  Please keep that appointment for further recommendations.    I hope this letter finds you well.  I encourage you to call with any questions or concerns you may have.    Sincerely,  Agustin Tian MD    CC: BRETT Pappas  CC: Dr. Malcolm Salinas

## 2025-08-01 NOTE — PATIENT INSTRUCTIONS
Recommend bowel cleanse with Miralax, which is over the counter. Dissolve 5 capfuls into 32 ounces of sugar-free gatorade/powerade and drink 8 ounces every 30 minutes until gone. Follow this with regular use of Miralax, titrating to achieve/maintain soft/formed bowel movements. Miralax is safe to use long-term. You can also repeat the bowel cleanse as needed.   Recommend 25-30 grams of fiber daily. May use Fibercon tablets to supplement as needed. Fibercon is less likely to cause gas/bloating.   Drink 64-80 ounces of water daily, with ideally half of that containing electrolytes (sugar free gatorade/powerade, electrolyte tablets)  Exercise is helpful for maintaining healthy bowel habits.  Magnesium oxide 400 mg

## 2025-08-01 NOTE — LETTER
August 4, 2025    Kathy Whittington  [address] [facility] [address]          Dear Ms. Whittington:  This letter is to review the biopsy report from your July 25, 2025 upper endoscopy.    Biopsies from the duodenum returned negative for celiac disease.  Biopsies from esophagus were negative for intestinal metaplasia/Fletcher's esophagus or eosinophilic esophagitis.    A cause for your iron deficiency anemia remains unclear and consideration should be given to a video capsule endoscopy/PillCam to look at the small bowel particularly if you are having refractory/recurrent iron deficiency.    I see you have a follow-up appointment on August 1, 2025 with BRETT Pappas.  Please keep that appointment for further recommendations.    I hope this letter finds you well.  I encourage you to call with any questions or concerns you may have.    Sincerely,  Agustin Tian MD    CC: BRETT Pappas  CC: Dr. Malcolm Salinas